# Patient Record
Sex: FEMALE | Race: WHITE | Employment: FULL TIME | URBAN - METROPOLITAN AREA
[De-identification: names, ages, dates, MRNs, and addresses within clinical notes are randomized per-mention and may not be internally consistent; named-entity substitution may affect disease eponyms.]

---
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
## 2018-09-08 ENCOUNTER — HOSPITAL ENCOUNTER (OUTPATIENT)
Age: 18
Discharge: HOME | End: 2018-09-08
Attending: FAMILY MEDICINE
Payer: MEDICAID

## 2018-09-08 VITALS
HEART RATE: 97 BPM | TEMPERATURE: 99 F | RESPIRATION RATE: 18 BRPM | BODY MASS INDEX: 25.76 KG/M2 | HEIGHT: 62 IN | OXYGEN SATURATION: 98 % | DIASTOLIC BLOOD PRESSURE: 83 MMHG | SYSTOLIC BLOOD PRESSURE: 135 MMHG | WEIGHT: 140 LBS

## 2018-09-08 DIAGNOSIS — H60.332 ACUTE SWIMMER'S EAR OF LEFT SIDE: Primary | ICD-10-CM

## 2018-09-08 PROCEDURE — 99213 OFFICE O/P EST LOW 20 MIN: CPT

## 2018-09-08 PROCEDURE — 99214 OFFICE O/P EST MOD 30 MIN: CPT

## 2018-09-08 RX ORDER — CIPROFLOXACIN AND DEXAMETHASONE 3; 1 MG/ML; MG/ML
4 SUSPENSION/ DROPS AURICULAR (OTIC) 2 TIMES DAILY
Qty: 1 BOTTLE | Refills: 0 | Status: SHIPPED | OUTPATIENT
Start: 2018-09-08 | End: 2018-09-18

## 2018-09-08 NOTE — ED PROVIDER NOTES
Patient presents with:  Ear Pain    HPI:     Booker Arce is a 19year old female who presents with chief complaint of left ear pain. Onset of symptoms was abrupt starting 3 days ago. Patient has been swimming recently.  Symptoms are reported as graduall 1. Acute swimmer's ear of left side H60.332    @    Plan:  Ciprodex drops as above  Avoid swimming or Q-tips  Monitor for worsening infection  All results reviewed and discussed with patient.   See AVS for detailed discharge instructions for your conditio

## 2019-03-20 ENCOUNTER — HOSPITAL ENCOUNTER (OUTPATIENT)
Age: 19
Discharge: HOME | End: 2019-03-20
Attending: FAMILY MEDICINE
Payer: MEDICAID

## 2019-03-20 VITALS
RESPIRATION RATE: 20 BRPM | WEIGHT: 145 LBS | BODY MASS INDEX: 26.68 KG/M2 | HEART RATE: 105 BPM | OXYGEN SATURATION: 98 % | TEMPERATURE: 97 F | SYSTOLIC BLOOD PRESSURE: 115 MMHG | DIASTOLIC BLOOD PRESSURE: 77 MMHG | HEIGHT: 62 IN

## 2019-03-20 DIAGNOSIS — H60.331 ACUTE SWIMMER'S EAR OF RIGHT SIDE: Primary | ICD-10-CM

## 2019-03-20 PROCEDURE — 99213 OFFICE O/P EST LOW 20 MIN: CPT

## 2019-03-20 PROCEDURE — 99214 OFFICE O/P EST MOD 30 MIN: CPT

## 2019-03-20 RX ORDER — CIPROFLOXACIN AND DEXAMETHASONE 3; 1 MG/ML; MG/ML
4 SUSPENSION/ DROPS AURICULAR (OTIC) 2 TIMES DAILY
Qty: 1 BOTTLE | Refills: 0 | Status: SHIPPED | OUTPATIENT
Start: 2019-03-20 | End: 2019-03-22

## 2019-03-20 NOTE — ED PROVIDER NOTES
Patient Seen in: [id number] [facility]    History   Patient presents with:  Ear Pain    Stated Complaint: ear pain    HPI    49-year-old female presents for right ear pain.   Patient states she has sudden onset of severe right ear pain since yes Constitutional: She is oriented to person, place, and time. She appears well-developed and well-nourished. HENT:   Head: Normocephalic and atraumatic.    Right Ear: External ear normal.   Left Ear: Hearing, tympanic membrane, external ear and ear canal

## 2019-03-22 RX ORDER — CIPROFLOXACIN AND DEXAMETHASONE 3; 1 MG/ML; MG/ML
4 SUSPENSION/ DROPS AURICULAR (OTIC) 2 TIMES DAILY
Qty: 1 BOTTLE | Refills: 0 | Status: SHIPPED | OUTPATIENT
Start: 2019-03-22 | End: 2019-03-29

## 2021-12-29 ENCOUNTER — HOSPITAL ENCOUNTER (OUTPATIENT)
Age: 21
Discharge: LEFT BEFORE DISCHARGE | End: 2021-12-29
Payer: COMMERCIAL